# Patient Record
Sex: FEMALE | Employment: OTHER | URBAN - METROPOLITAN AREA
[De-identification: names, ages, dates, MRNs, and addresses within clinical notes are randomized per-mention and may not be internally consistent; named-entity substitution may affect disease eponyms.]

---

## 2020-04-17 ENCOUNTER — APPOINTMENT (OUTPATIENT)
Dept: GENERAL RADIOLOGY | Facility: HOSPITAL | Age: 75
DRG: 177 | End: 2020-04-17
Attending: EMERGENCY MEDICINE
Payer: MEDICARE

## 2020-04-17 ENCOUNTER — HOSPITAL ENCOUNTER (INPATIENT)
Facility: HOSPITAL | Age: 75
LOS: 2 days | Discharge: HOME | DRG: 177 | End: 2020-04-19
Attending: EMERGENCY MEDICINE | Admitting: HOSPITALIST
Payer: MEDICARE

## 2020-04-17 DIAGNOSIS — U07.1 PNEUMONIA DUE TO COVID-19 VIRUS: Primary | ICD-10-CM

## 2020-04-17 DIAGNOSIS — J12.82 PNEUMONIA DUE TO COVID-19 VIRUS: Primary | ICD-10-CM

## 2020-04-17 PROBLEM — D64.9 ANEMIA: Status: ACTIVE | Noted: 2020-04-17

## 2020-04-17 PROBLEM — E87.1 HYPONATREMIA: Status: ACTIVE | Noted: 2020-04-17

## 2020-04-17 PROBLEM — R73.9 HYPERGLYCEMIA: Status: ACTIVE | Noted: 2020-04-17

## 2020-04-17 PROBLEM — E87.6 HYPOKALEMIA: Status: ACTIVE | Noted: 2020-04-17

## 2020-04-17 PROCEDURE — 71045 X-RAY EXAM CHEST 1 VIEW: CPT | Performed by: EMERGENCY MEDICINE

## 2020-04-17 PROCEDURE — 99223 1ST HOSP IP/OBS HIGH 75: CPT | Performed by: HOSPITALIST

## 2020-04-17 RX ORDER — HYDROMORPHONE HYDROCHLORIDE 1 MG/ML
0.5 INJECTION, SOLUTION INTRAMUSCULAR; INTRAVENOUS; SUBCUTANEOUS EVERY 30 MIN PRN
Status: ACTIVE | OUTPATIENT
Start: 2020-04-17 | End: 2020-04-17

## 2020-04-17 RX ORDER — HYDROXYCHLOROQUINE SULFATE 200 MG/1
400 TABLET, FILM COATED ORAL 2 TIMES DAILY
Status: COMPLETED | OUTPATIENT
Start: 2020-04-17 | End: 2020-04-18

## 2020-04-17 RX ORDER — POTASSIUM CHLORIDE 20 MEQ/1
40 TABLET, EXTENDED RELEASE ORAL EVERY 4 HOURS
Status: COMPLETED | OUTPATIENT
Start: 2020-04-17 | End: 2020-04-18

## 2020-04-17 RX ORDER — AZITHROMYCIN 250 MG/1
500 TABLET, FILM COATED ORAL EVERY EVENING
Status: DISCONTINUED | OUTPATIENT
Start: 2020-04-17 | End: 2020-04-19

## 2020-04-17 RX ORDER — ACETAMINOPHEN 325 MG/1
650 TABLET ORAL EVERY 6 HOURS PRN
Status: DISCONTINUED | OUTPATIENT
Start: 2020-04-17 | End: 2020-04-19

## 2020-04-17 RX ORDER — LATANOPROST 50 UG/ML
1 SOLUTION/ DROPS OPHTHALMIC NIGHTLY
Status: DISCONTINUED | OUTPATIENT
Start: 2020-04-17 | End: 2020-04-19

## 2020-04-17 RX ORDER — HYDROXYCHLOROQUINE SULFATE 200 MG/1
200 TABLET, FILM COATED ORAL 2 TIMES DAILY
Status: DISCONTINUED | OUTPATIENT
Start: 2020-04-18 | End: 2020-04-19

## 2020-04-17 RX ORDER — ENOXAPARIN SODIUM 100 MG/ML
40 INJECTION SUBCUTANEOUS NIGHTLY
Status: DISCONTINUED | OUTPATIENT
Start: 2020-04-17 | End: 2020-04-19

## 2020-04-17 RX ORDER — ONDANSETRON 2 MG/ML
4 INJECTION INTRAMUSCULAR; INTRAVENOUS EVERY 4 HOURS PRN
Status: DISCONTINUED | OUTPATIENT
Start: 2020-04-17 | End: 2020-04-19

## 2020-04-17 NOTE — ED NOTES
Ann Wise spoke with pts daughter regarding POA paperwork. Per  they only discussed power of  and did not yet discuss advance directives such as DNR or DNI.  Per  that is something that can be discussed with patient and family when pat

## 2020-04-17 NOTE — ED PROVIDER NOTES
Patient Seen in: [facility] Emergency Department      History   Patient presents with:  Fever  Cough/URI  Fatigue    Stated Complaint: cough, fever, fatigue,  is covid+    HPI    61-year-old female who presents here to the emergency departmen appreciated. No accessory muscle use noted for breathing. Cardiac: Heart rate of 110 normal S1 and 2 without murmurs or ectopy appreciated  Abdomen: Soft on examination without tenderness to deep palpation or to percussion. No masses appreciated.   Bowel DIFFERENTIAL WITH PLATELET    Narrative: The following orders were created for panel order CBC WITH DIFFERENTIAL WITH PLATELET.   Procedure                               Abnormality         Status                     --------- Glucose of 100 sodium 132 potassium 3.3 chloride of 96.   Ferritin is 707 chest x-ray performed to assess for pneumonia showed no lobar consolidation but there is interstitial and perihilar opacities which are nonspecific and may represent a viral infectiou

## 2020-04-17 NOTE — ED INITIAL ASSESSMENT (HPI)
Pt to ed with cough, sob, fatigue, fever.  has covid and is inpatient at this time. Metastatic breast cancer.

## 2020-04-17 NOTE — PROGRESS NOTES
04/17/20 1604   Clinical Encounter Type   Visited With Health care provider  (RN Adelaide/Daughter Quincy Barillas)   Routine Visit   (Responded to request for consult - advance directives)    provided education re. PoA to patient's daughter;   not

## 2020-04-17 NOTE — H&P
[facility]  History and Physical     Fide Gonzales Patient Status:  Emergency    1945 MRN [id number]   Location [address] [facility] Attending Erika Recinos MD   Hosp Day # 0 PCP NATALIO PEARSON     Chief Complaint: sob bilaterally. No wheezes. No rhonchi. Cardiovascular: S1, S2. Regular rate and rhythm. No murmurs, rubs or gallops. Equal pulses. Chest and Back: No tenderness or deformity. Abdomen: Soft, nontender, nondistended. Positive bowel sounds.  No rebound, gua

## 2020-04-17 NOTE — ED NOTES
Report given to [facility] on floor [id number]. Per Marisela Schumacher they are not ready yet for patient to be sent up to floor right at this time, they will call back once they are ready to take patient up on floor.

## 2020-04-17 NOTE — CONSULTS
Pulmonary H&P/Consult       NAME: Chepe Will - ROOM: A3/A3 - MRN: [id number] - Age: 69year old - :  1945    Date of Admission: 2020  1:47 PM  Admission Diagnosis: No admission diagnoses are documented for this encounter.   Reason for [facility] per week: Not on file        Minutes per session: Not on file      Stress: Not on file    Relationships      Social connections:        Talks on phone: Not on file        Gets together: Not on file        Attends Faith service: Not on file        Activ 99.5 °F (37.5 °C) (Oral)   Resp 19   Wt 121 lb 7.6 oz (55.1 kg)   SpO2 100%   BMI 21.52 kg/m²     General Appearance:    Alert, cooperative, no distress, appears stated age   Head:    Normocephalic, without obvious abnormality, atraumatic   Eyes:    PERRL, Critical Care

## 2020-04-17 NOTE — ED NOTES
Pt denies any pain at this time. Mask is on. Patient's daughter is not in the room at this time. Patient states that she is feeling okay. Noted patient is currently on 1L of O2 per NC. O2 sat 100%.

## 2020-04-18 PROCEDURE — 99232 SBSQ HOSP IP/OBS MODERATE 35: CPT | Performed by: HOSPITALIST

## 2020-04-18 NOTE — PROGRESS NOTES
Assumed care of patient at 0730. Alert and oriented. Vital signs stable. Sinus rhythm/sinus tach on telemetry. Patient with 1L O2 this AM. O2 saturations high 90s. Breathing easy, unlabored. Very slight dry cough.   Lungs clear/diminished on auscultatio

## 2020-04-18 NOTE — PROGRESS NOTES
[facility]  Progress Note     Jessica Adorno Patient Status:  Inpatient    1945 MRN [id number]   [facility] 3SW-A Attending Kaykay Brody MD   Hosp Day # 1 PCP NATALIO PEARSON     Chief Complaint: sob    S: Patient feeling better currently down to 1L  4. No signs if bacterial superinfx  5. Inflammatory markers mild, ctm  6. plaq/azithro  3. transaminitis 2/2 above  4. Metastatic breast cancer to lungs  1.  Chemo last week  2. onc does not come here, will likely need to hold chemo un

## 2020-04-18 NOTE — PLAN OF CARE
NURSING ADMISSION NOTE      Patient admitted via Cart from ED  Oriented to room. Safety precautions initiated. Bed in low position. Call light in reach. Patient a/o x4. Syriac speaking. Fatigue. Denies pain. Dry cough, Sob on exertion noted.  Max

## 2020-04-18 NOTE — PROGRESS NOTES
Pulmonary Progress Note      NAME: Juan Ortiz - ROOM: [address] [place], [place] [address]F - MRN: [id number] - Age: 69year old - : 1945    Assessment/Plan:  1. Acute Hypoxic Resp failure - COVID +  -IS  -prone positioning  -wean O2 as tolerated  2.  COVID19 +  - dx

## 2020-04-19 VITALS
HEART RATE: 99 BPM | OXYGEN SATURATION: 94 % | BODY MASS INDEX: 19 KG/M2 | SYSTOLIC BLOOD PRESSURE: 107 MMHG | RESPIRATION RATE: 18 BRPM | TEMPERATURE: 98 F | WEIGHT: 106 LBS | DIASTOLIC BLOOD PRESSURE: 62 MMHG

## 2020-04-19 PROCEDURE — 99239 HOSP IP/OBS DSCHRG MGMT >30: CPT | Performed by: HOSPITALIST

## 2020-04-19 RX ORDER — HYDROXYCHLOROQUINE SULFATE 200 MG/1
200 TABLET, FILM COATED ORAL 2 TIMES DAILY
Qty: 6 TABLET | Refills: 0 | Status: SHIPPED | OUTPATIENT
Start: 2020-04-19 | End: 2020-04-22

## 2020-04-19 NOTE — PROGRESS NOTES
[facility] Patient Status:  Inpatient    1945 MRN [id number]   [facility] 3SW-A Attending Gemini Jeffrey MD   Hosp Day # 2 PCP NATALIO PEARSON     SUBJECTIVE: No acute events overnight. Pt denies complaints today. 96.8 04/19/2020    MCH 32.2 04/19/2020    MCHC 33.2 04/19/2020    RDW 14.0 04/19/2020    .0 04/19/2020        No results found for: PT, INR     COVID-19 Lab Results    COVID-19  Lab Results   Component Value Date    COVID19 Detected (AA) 04/17/2020

## 2020-04-19 NOTE — PROGRESS NOTES
Assumed care of patient, spoke with IM who is agreeable with plan to discharge today. POA and daughter, Sushma Moscoso, called to update. Per daughter she can come to hospital at 4pm to pickup patient. Pt updated on plan of care. R.A. tolerating diet.      Disc

## 2020-04-19 NOTE — PLAN OF CARE
Assumed care of pt at [address]. Pt A/O x4. Vitals stable. Afebrile. On RA, O2 saturations at 96%. Pt c/o slight nonproductive cough. Pt denies SOB. Plaquenil given per orders. Contact and droplet isolation maintained for + COVID. Call light within reach.  Will

## 2020-04-19 NOTE — PLAN OF CARE
Patient to be discharged home this afternoon.      Problem: Patient/Family Goals  Goal: Patient/Family Long Term Goal  Description  Patient's Long Term Goal:    Interventions:  - See additional Care Plan goals for specific interventions  Outcome: Adequate f

## 2020-04-20 NOTE — DISCHARGE SUMMARY
[facility] [place] [facility]  DISCHARGE SUMMARY     Fide Gonzales Patient Status:  Inpatient    1945 MRN [id number]   [facility] 3SW-A Attending No att. providers found   [facility] Day # 2 PCP NATALIO PEARSON     Date of Admission: 2020  Date of D · none    Consultants:  • pulm    Discharge Medication List:     Discharge Medications      START taking these medications      Instructions Prescription details   Hydroxychloroquine Sulfate 200 MG Tabs  Commonly known as:  PLAQUENIL      Take 1 tablet (

## 2021-03-16 NOTE — PAYOR COMM NOTE
--------------  DISCHARGE REVIEW    Patrick Aponte [facility]  Subscriber #:  [id number]  Authorization Number: [id number]    Admit date: 4/17/20  Admit time:  1916  Discharge Date: 4/19/2020  4:07 PM     Admitting Physician: Lea Carcamo MD  Attending Tavo Pruett Improved. She will follow up with [facility] if symptoms worsen. fatigue 8 days ago. Fever and cough resolved but fatigue has persisted. Yesterday developed cough and SOB. Occasionally productive of yellow sputum. Denies any further fever, chills, n/v, c/d, abd pain, cp, palpitations.  Sob is minimal. Of note, had chemo ()/(55-62) 107/62    Physical Exam:    General: No acute distress. Respiratory: Clear to auscultation bilaterally. No wheezes. No rhonchi. Cardiovascular: S1, S2. Regular rate and rhythm. No murmurs, rubs or gallops.    Abdomen: Soft, nontender, no